# Patient Record
Sex: MALE | Race: BLACK OR AFRICAN AMERICAN | Employment: OTHER | URBAN - METROPOLITAN AREA
[De-identification: names, ages, dates, MRNs, and addresses within clinical notes are randomized per-mention and may not be internally consistent; named-entity substitution may affect disease eponyms.]

---

## 2023-12-29 ENCOUNTER — APPOINTMENT (OUTPATIENT)
Dept: CT IMAGING | Facility: HOSPITAL | Age: 63
End: 2023-12-29
Attending: EMERGENCY MEDICINE
Payer: COMMERCIAL

## 2023-12-29 ENCOUNTER — HOSPITAL ENCOUNTER (INPATIENT)
Facility: HOSPITAL | Age: 63
LOS: 1 days | Discharge: HOME | End: 2023-12-30
Attending: EMERGENCY MEDICINE | Admitting: HOSPITALIST
Payer: COMMERCIAL

## 2023-12-29 ENCOUNTER — APPOINTMENT (OUTPATIENT)
Dept: MRI IMAGING | Facility: HOSPITAL | Age: 63
End: 2023-12-29
Attending: EMERGENCY MEDICINE
Payer: COMMERCIAL

## 2023-12-29 DIAGNOSIS — H53.2 DIPLOPIA: Primary | ICD-10-CM

## 2023-12-29 DIAGNOSIS — R26.81 UNSTEADY GAIT WHEN WALKING: ICD-10-CM

## 2023-12-29 DIAGNOSIS — H49.01 RIGHT OCULOMOTOR NERVE PALSY: ICD-10-CM

## 2023-12-29 PROBLEM — I63.9 CEREBROVASCULAR ACCIDENT (CVA) (HCC): Status: ACTIVE | Noted: 2023-12-29

## 2023-12-29 LAB
ANION GAP SERPL CALC-SCNC: 5 MMOL/L (ref 0–18)
BASOPHILS # BLD AUTO: 0.05 X10(3) UL (ref 0–0.2)
BASOPHILS NFR BLD AUTO: 0.6 %
BUN BLD-MCNC: 17 MG/DL (ref 9–23)
BUN/CREAT SERPL: 18.7 (ref 10–20)
CALCIUM BLD-MCNC: 9.4 MG/DL (ref 8.7–10.4)
CHLORIDE SERPL-SCNC: 108 MMOL/L (ref 98–112)
CHOLEST SERPL-MCNC: 187 MG/DL (ref ?–200)
CO2 SERPL-SCNC: 27 MMOL/L (ref 21–32)
CREAT BLD-MCNC: 0.91 MG/DL
DEPRECATED RDW RBC AUTO: 41.4 FL (ref 35.1–46.3)
EGFRCR SERPLBLD CKD-EPI 2021: 96 ML/MIN/1.73M2 (ref 60–?)
EOSINOPHIL # BLD AUTO: 0.33 X10(3) UL (ref 0–0.7)
EOSINOPHIL NFR BLD AUTO: 4.2 %
ERYTHROCYTE [DISTWIDTH] IN BLOOD BY AUTOMATED COUNT: 14.1 % (ref 11–15)
EST. AVERAGE GLUCOSE BLD GHB EST-MCNC: 143 MG/DL (ref 68–126)
GLUCOSE BLD-MCNC: 133 MG/DL (ref 70–99)
GLUCOSE BLDC GLUCOMTR-MCNC: 129 MG/DL (ref 70–99)
GLUCOSE BLDC GLUCOMTR-MCNC: 147 MG/DL (ref 70–99)
GLUCOSE BLDC GLUCOMTR-MCNC: 80 MG/DL (ref 70–99)
HBA1C MFR BLD: 6.6 % (ref ?–5.7)
HCT VFR BLD AUTO: 46.8 %
HDLC SERPL-MCNC: 38 MG/DL (ref 40–59)
HGB BLD-MCNC: 14.6 G/DL
IMM GRANULOCYTES # BLD AUTO: 0.07 X10(3) UL (ref 0–1)
IMM GRANULOCYTES NFR BLD: 0.9 %
LDLC SERPL CALC-MCNC: 132 MG/DL (ref ?–100)
LYMPHOCYTES # BLD AUTO: 1.68 X10(3) UL (ref 1–4)
LYMPHOCYTES NFR BLD AUTO: 21.2 %
MAGNESIUM SERPL-MCNC: 1.9 MG/DL (ref 1.6–2.6)
MCH RBC QN AUTO: 25.5 PG (ref 26–34)
MCHC RBC AUTO-ENTMCNC: 31.2 G/DL (ref 31–37)
MCV RBC AUTO: 81.7 FL
MONOCYTES # BLD AUTO: 0.51 X10(3) UL (ref 0.1–1)
MONOCYTES NFR BLD AUTO: 6.4 %
NEUTROPHILS # BLD AUTO: 5.3 X10 (3) UL (ref 1.5–7.7)
NEUTROPHILS # BLD AUTO: 5.3 X10(3) UL (ref 1.5–7.7)
NEUTROPHILS NFR BLD AUTO: 66.7 %
NONHDLC SERPL-MCNC: 149 MG/DL (ref ?–130)
OSMOLALITY SERPL CALC.SUM OF ELEC: 293 MOSM/KG (ref 275–295)
PLATELET # BLD AUTO: 331 10(3)UL (ref 150–450)
POTASSIUM SERPL-SCNC: 4.1 MMOL/L (ref 3.5–5.1)
RBC # BLD AUTO: 5.73 X10(6)UL
SODIUM SERPL-SCNC: 140 MMOL/L (ref 136–145)
TRIGL SERPL-MCNC: 92 MG/DL (ref 30–149)
VLDLC SERPL CALC-MCNC: 17 MG/DL (ref 0–30)
WBC # BLD AUTO: 7.9 X10(3) UL (ref 4–11)

## 2023-12-29 PROCEDURE — 70551 MRI BRAIN STEM W/O DYE: CPT | Performed by: EMERGENCY MEDICINE

## 2023-12-29 PROCEDURE — 70450 CT HEAD/BRAIN W/O DYE: CPT | Performed by: EMERGENCY MEDICINE

## 2023-12-29 PROCEDURE — 70498 CT ANGIOGRAPHY NECK: CPT | Performed by: EMERGENCY MEDICINE

## 2023-12-29 PROCEDURE — 70496 CT ANGIOGRAPHY HEAD: CPT | Performed by: EMERGENCY MEDICINE

## 2023-12-29 PROCEDURE — 99223 1ST HOSP IP/OBS HIGH 75: CPT | Performed by: OTHER

## 2023-12-29 RX ORDER — METFORMIN HYDROCHLORIDE EXTENDED-RELEASE TABLETS 500 MG/1
500 TABLET, FILM COATED, EXTENDED RELEASE ORAL
COMMUNITY

## 2023-12-29 RX ORDER — HEPARIN SODIUM 5000 [USP'U]/ML
5000 INJECTION, SOLUTION INTRAVENOUS; SUBCUTANEOUS EVERY 12 HOURS SCHEDULED
Status: DISCONTINUED | OUTPATIENT
Start: 2023-12-29 | End: 2023-12-30

## 2023-12-29 RX ORDER — NICOTINE POLACRILEX 4 MG
15 LOZENGE BUCCAL
Status: DISCONTINUED | OUTPATIENT
Start: 2023-12-29 | End: 2023-12-30

## 2023-12-29 RX ORDER — POLYETHYLENE GLYCOL 3350 17 G/17G
17 POWDER, FOR SOLUTION ORAL DAILY PRN
Status: DISCONTINUED | OUTPATIENT
Start: 2023-12-29 | End: 2023-12-30

## 2023-12-29 RX ORDER — AMLODIPINE BESYLATE 5 MG/1
5 TABLET ORAL DAILY
Status: DISCONTINUED | OUTPATIENT
Start: 2023-12-30 | End: 2023-12-30

## 2023-12-29 RX ORDER — LABETALOL HYDROCHLORIDE 5 MG/ML
10 INJECTION, SOLUTION INTRAVENOUS EVERY 10 MIN PRN
Status: DISCONTINUED | OUTPATIENT
Start: 2023-12-29 | End: 2023-12-30

## 2023-12-29 RX ORDER — ACETAMINOPHEN 325 MG/1
650 TABLET ORAL EVERY 4 HOURS PRN
Status: DISCONTINUED | OUTPATIENT
Start: 2023-12-29 | End: 2023-12-30

## 2023-12-29 RX ORDER — ATORVASTATIN CALCIUM 10 MG/1
10 TABLET, FILM COATED ORAL NIGHTLY
COMMUNITY

## 2023-12-29 RX ORDER — ACETAMINOPHEN 650 MG/1
650 SUPPOSITORY RECTAL EVERY 4 HOURS PRN
Status: DISCONTINUED | OUTPATIENT
Start: 2023-12-29 | End: 2023-12-30

## 2023-12-29 RX ORDER — ACETAMINOPHEN 500 MG
500 TABLET ORAL EVERY 4 HOURS PRN
Status: DISCONTINUED | OUTPATIENT
Start: 2023-12-29 | End: 2023-12-29

## 2023-12-29 RX ORDER — DEXTROSE MONOHYDRATE 25 G/50ML
50 INJECTION, SOLUTION INTRAVENOUS
Status: DISCONTINUED | OUTPATIENT
Start: 2023-12-29 | End: 2023-12-30

## 2023-12-29 RX ORDER — AMLODIPINE BESYLATE 5 MG/1
10 TABLET ORAL ONCE
Status: COMPLETED | OUTPATIENT
Start: 2023-12-29 | End: 2023-12-29

## 2023-12-29 RX ORDER — CLOPIDOGREL BISULFATE 75 MG/1
75 TABLET ORAL DAILY
Status: DISCONTINUED | OUTPATIENT
Start: 2023-12-30 | End: 2023-12-30

## 2023-12-29 RX ORDER — CLOPIDOGREL BISULFATE 75 MG/1
75 TABLET ORAL ONCE
Status: COMPLETED | OUTPATIENT
Start: 2023-12-29 | End: 2023-12-29

## 2023-12-29 RX ORDER — ONDANSETRON 2 MG/ML
4 INJECTION INTRAMUSCULAR; INTRAVENOUS EVERY 6 HOURS PRN
Status: DISCONTINUED | OUTPATIENT
Start: 2023-12-29 | End: 2023-12-29

## 2023-12-29 RX ORDER — PROCHLORPERAZINE EDISYLATE 5 MG/ML
5 INJECTION INTRAMUSCULAR; INTRAVENOUS EVERY 8 HOURS PRN
Status: DISCONTINUED | OUTPATIENT
Start: 2023-12-29 | End: 2023-12-29

## 2023-12-29 RX ORDER — SENNOSIDES 8.6 MG
17.2 TABLET ORAL NIGHTLY PRN
Status: DISCONTINUED | OUTPATIENT
Start: 2023-12-29 | End: 2023-12-30

## 2023-12-29 RX ORDER — NICOTINE POLACRILEX 4 MG
30 LOZENGE BUCCAL
Status: DISCONTINUED | OUTPATIENT
Start: 2023-12-29 | End: 2023-12-30

## 2023-12-29 RX ORDER — ASPIRIN 81 MG/1
81 TABLET ORAL DAILY
COMMUNITY

## 2023-12-29 RX ORDER — HYDRALAZINE HYDROCHLORIDE 20 MG/ML
10 INJECTION INTRAMUSCULAR; INTRAVENOUS EVERY 2 HOUR PRN
Status: DISCONTINUED | OUTPATIENT
Start: 2023-12-29 | End: 2023-12-30

## 2023-12-29 RX ORDER — ATORVASTATIN CALCIUM 40 MG/1
40 TABLET, FILM COATED ORAL NIGHTLY
Status: DISCONTINUED | OUTPATIENT
Start: 2023-12-29 | End: 2023-12-30

## 2023-12-29 RX ORDER — CLOPIDOGREL BISULFATE 75 MG/1
75 TABLET ORAL DAILY
COMMUNITY

## 2023-12-29 RX ORDER — ONDANSETRON 2 MG/ML
4 INJECTION INTRAMUSCULAR; INTRAVENOUS EVERY 6 HOURS PRN
Status: DISCONTINUED | OUTPATIENT
Start: 2023-12-29 | End: 2023-12-30

## 2023-12-29 RX ORDER — BISACODYL 10 MG
10 SUPPOSITORY, RECTAL RECTAL
Status: DISCONTINUED | OUTPATIENT
Start: 2023-12-29 | End: 2023-12-30

## 2023-12-29 RX ORDER — ASPIRIN 81 MG/1
81 TABLET, CHEWABLE ORAL DAILY
Status: DISCONTINUED | OUTPATIENT
Start: 2023-12-29 | End: 2023-12-30

## 2023-12-29 RX ORDER — PROCHLORPERAZINE EDISYLATE 5 MG/ML
5 INJECTION INTRAMUSCULAR; INTRAVENOUS EVERY 8 HOURS PRN
Status: DISCONTINUED | OUTPATIENT
Start: 2023-12-29 | End: 2023-12-30

## 2023-12-29 RX ORDER — SODIUM CHLORIDE 9 MG/ML
INJECTION, SOLUTION INTRAVENOUS CONTINUOUS
Status: DISCONTINUED | OUTPATIENT
Start: 2023-12-29 | End: 2023-12-30

## 2023-12-29 NOTE — ED QUICK NOTES
Assumed care of Pt from triage for c/o double / blurred vision to R eye since 3pm yesterday. States he felt off balance this morning, prompting his visit to the ED.  Stroke alert called at 10:03 am.

## 2023-12-29 NOTE — ED QUICK NOTES
No acute changes from my initial exam. Continues to report blurred/double vision to R eye. Awaiting results of MRI.

## 2023-12-29 NOTE — PLAN OF CARE
Ryan Whiting is A&Ox 4 . Lives at home alone. Patient is ambulating by himself . Patient is not experiencing pain. Continent of bowel and bladder. IV fluids running. Call light in reach, fall precautions maintained. Plan is for echo and neuro Q2 with daily NIH . Problem: Patient Centered Care  Goal: Patient preferences are identified and integrated in the patient's plan of care  Description: Interventions:  - What would you like us to know as we care for you? I live at home alone  - Provide timely, complete, and accurate information to patient/family  - Incorporate patient and family knowledge, values, beliefs, and cultural backgrounds into the planning and delivery of care  - Encourage patient/family to participate in care and decision-making at the level they choose  - Honor patient and family perspectives and choices  Outcome: Progressing    Problem: Diabetes/Glucose Control  Goal: Glucose maintained within prescribed range  Description: INTERVENTIONS:  - Monitor Blood Glucose as ordered  - Assess for signs and symptoms of hyperglycemia and hypoglycemia  - Administer ordered medications to maintain glucose within target range  - Assess barriers to adequate nutritional intake and initiate nutrition consult as needed  - Instruct patient on self management of diabetes  Outcome: Progressing     Problem: SAFETY ADULT - FALL  Goal: Free from fall injury  Description: INTERVENTIONS:  - Assess pt frequently for physical needs  - Identify cognitive and physical deficits and behaviors that affect risk of falls.   - [place] fall precautions as indicated by assessment.  - Educate pt/family on patient safety including physical limitations  - Instruct pt to call for assistance with activity based on assessment  - Modify environment to reduce risk of injury  - Provide assistive devices as appropriate  - Consider OT/PT consult to assist with strengthening/mobility  - Encourage toileting schedule  Outcome: Progressing Problem: NEUROLOGICAL - ADULT  Goal: Achieves stable or improved neurological status  Description: INTERVENTIONS  - Assess for and report changes in neurological status  - Initiate measures to prevent increased intracranial pressure  - Maintain blood pressure and fluid volume within ordered parameters to optimize cerebral perfusion and minimize risk of hemorrhage  - Monitor temperature, glucose, and sodium.  Initiate appropriate interventions as ordered  Outcome: Progressing  Goal: Absence of seizures  Description: INTERVENTIONS  - Monitor for seizure activity  - Administer anti-seizure medications as ordered  - Monitor neurological status  Outcome: Progressing  Goal: Remains free of injury related to seizure activity  Description: INTERVENTIONS:  - Maintain airway, patient safety  and administer oxygen as ordered  - Monitor patient for seizure activity, document and report duration and description of seizure to MD/LIP  - If seizure occurs, turn patient to side and suction secretions as needed  - Reorient patient post seizure  - Seizure pads on all 4 side rails  - Instruct patient/family to notify RN of any seizure activity  - Instruct patient/family to call for assistance with activity based on assessment  Outcome: Progressing  Goal: Achieves maximal functionality and self care  Description: INTERVENTIONS  - Monitor swallowing and airway patency with patient fatigue and changes in neurological status  - Encourage and assist patient to increase activity and self care with guidance from PT/OT  - Encourage visually impaired, hearing impaired and aphasic patients to use assistive/communication devices  Outcome: Progressing

## 2023-12-29 NOTE — ED QUICK NOTES
Orders for admission, patient is aware of plan and ready to go upstairs. Any questions, please call ED RN Marylen Ivans at extension [phone].      Patient Covid vaccination status: Fully vaccinated     COVID Test Ordered in ED: None    COVID Suspicion at Admission: N/A    Running Infusions:  None    Mental Status/LOC at time of transport: alert and oriented 4/4    Other pertinent information:   CIWA score: N/A   NIH score:  1

## 2023-12-29 NOTE — ED QUICK NOTES
Home meds reviewed with Dr Carina Rowell (Pt did not take his Plavix or Amlodipine today). Admin as ordered.

## 2023-12-29 NOTE — ED INITIAL ASSESSMENT (HPI)
Patient to ED for double vision in right eye since yesterday beginning at approx 1500. States he has been feeling \"off balance\" shortly after blurred vision started. Hx stroke 1.5 months ago and 2 years ago. On Plavix and baby ASA.  A/Ox4

## 2023-12-30 ENCOUNTER — APPOINTMENT (OUTPATIENT)
Dept: CV DIAGNOSTICS | Facility: HOSPITAL | Age: 63
End: 2023-12-30
Attending: Other
Payer: COMMERCIAL

## 2023-12-30 VITALS
OXYGEN SATURATION: 97 % | TEMPERATURE: 98 F | WEIGHT: 245.81 LBS | HEIGHT: 74 IN | RESPIRATION RATE: 18 BRPM | BODY MASS INDEX: 31.55 KG/M2 | HEART RATE: 78 BPM | SYSTOLIC BLOOD PRESSURE: 156 MMHG | DIASTOLIC BLOOD PRESSURE: 100 MMHG

## 2023-12-30 PROBLEM — H51.21 INO (INTERNUCLEAR OPHTHALMOPLEGIA), RIGHT: Status: ACTIVE | Noted: 2023-12-30

## 2023-12-30 PROBLEM — R26.81 GAIT INSTABILITY: Status: ACTIVE | Noted: 2023-12-29

## 2023-12-30 PROBLEM — H49.01 RIGHT OCULOMOTOR NERVE PALSY: Status: RESOLVED | Noted: 2023-12-29 | Resolved: 2023-12-30

## 2023-12-30 PROBLEM — H53.2 DIPLOPIA: Status: RESOLVED | Noted: 2023-12-29 | Resolved: 2023-12-30

## 2023-12-30 PROBLEM — R26.81 UNSTEADY GAIT WHEN WALKING: Status: RESOLVED | Noted: 2023-12-29 | Resolved: 2023-12-30

## 2023-12-30 PROBLEM — I67.82 SUBCORTICAL MICROVASCULAR ISCHEMIC OCCLUSIVE DISEASE: Status: ACTIVE | Noted: 2023-12-30

## 2023-12-30 PROBLEM — I63.9 CEREBROVASCULAR ACCIDENT (CVA) (HCC): Status: RESOLVED | Noted: 2023-12-29 | Resolved: 2023-12-30

## 2023-12-30 LAB
GLUCOSE BLDC GLUCOMTR-MCNC: 113 MG/DL (ref 70–99)
GLUCOSE BLDC GLUCOMTR-MCNC: 116 MG/DL (ref 70–99)

## 2023-12-30 PROCEDURE — 93306 TTE W/DOPPLER COMPLETE: CPT | Performed by: OTHER

## 2023-12-30 PROCEDURE — 99232 SBSQ HOSP IP/OBS MODERATE 35: CPT | Performed by: OTHER

## 2023-12-30 NOTE — SLP NOTE
ADULT SWALLOWING EVALUATION    ASSESSMENT    ASSESSMENT/OVERALL IMPRESSION:    Orders received, chart reviewed, clinical bedside swallow evaluation complete per stroke protocol. Patient admitted from home with c/o diplopia and blurry vision, PMH includes CVA 11/2023 without residual deficits, CTH and MRI brain 12/29/23 non acute as below. No hx SLP service per this EMR review. Patient denies acute/remote dysphagia, denies acute/remote changes in cognition and/or communication. Patient passed RN swallow screen 12/29/23. No family at bedside. Patient seated upright in chair at bedside, afebrile, in NAD, oriented x4. Oral motor mechanism examination unremarkable. Speech intelligibility 100% in conversation. Expressive and receptive language appear intact to support conversation without overt word finding or comprehension deficits. Patient self-administered bites of dry solid crackers and cup/straw sips thin liquids with oropharyngeal timing and control which appears adequate/functional and without overt signs aspiration/distress. At this time, patient presents without overt evidence oropharyngeal dysphagia at bedside and appears appropriate to continue current diet with standard aspiration precautions. As well, patient presents without acute concern for changes to cognitive-communication per informal assessment and negative imaging. No further SLP service appears indicated at this time, will sign off. Plan and recommendations reviewed with patient and RN at bedside. FCM score:7/7.      RECOMMENDATIONS   Diet Recommendations - Solids: Regular  Diet Recommendations - Liquids:  Thin Liquids  Medication Administration Recommendations: No restrictions  Treatment Plan/Recommendations: No further inpatient SLP service warranted  Discharge Recommendations/Plan: [address] [facility]  Reason for Referral: Stroke protocol    Problem List  Principal Problem:    Diplopia  Active Problems: Cerebrovascular accident (CVA) ([facility] [address]    Unsteady gait when walking    Right oculomotor nerve palsy    Past Medical History  Past Medical History:   Diagnosis Date    Essential hypertension     Hyperlipidemia     Stroke [facility])     2019, Nov 2023     Prior Living Situation: Home alone  Diet Prior to Admission: Regular; Thin liquids  Precautions: Aspiration    Patient/Family Goals: Return home    SWALLOWING HISTORY  Current Diet Consistency: Regular; Thin liquids  Dysphagia History: None as above    Imaging Results:     MRI brain 12/29/23:  CONCLUSION:   1. No acute infarct or hemorrhage. 2. Nonspecific pathologic foci in cerebral white matter likely reflect moderate changes of chronic small vessel disease. 3. Chronic right internal capsule and left thalamic lacunar infarcts related to small vessel disease. [facility] 12/29/23:  CONCLUSION:   1. Nonspecific increased density of the right middle cerebral artery which could represent atherosclerosis or thrombus. Recommend follow-up CTA. 2.  Scattered hypodensities within the cerebral white matter likely representing changes of microvascular white matter ischemic change related to long-standing hypertension and/or diabetes. OBJECTIVE     Dentition: Functional  Symmetry: Within Functional Limits  Strength: Within Functional Limits  Tone: Within Functional Limits  Range of Motion: Within Functional Limits  Rate of Motion: Within Functional Limits    Voice Quality: Clear  Respiratory Status: Unlabored  Consistencies Trialed: Thin liquids; Hard solid  Method of Presentation: Self presentation  Patient Positioning: Upright;Midline    Oral Phase of Swallow: Within Functional Limits    Pharyngeal Phase of Swallow: Within Functional Limits  (Please note: Silent aspiration cannot be evaluated clinically.  Videofluoroscopic Swallow Study is required to rule-out silent aspiration.)    Esophageal Phase of Swallow: No complaints consistent with possible esophageal involvement      FOLLOW UP  Treatment Plan/Recommendations: No further inpatient SLP service warranted     Follow Up Needed (Documentation Required): No       Thank you for your referral.   If you have any questions, please contact RAMOS Kaur M.S. [place] Pathologist  [phone]

## 2023-12-30 NOTE — CM/SW NOTE
[facility]. Per chart, PT/OT recommend Home. Notes still pending at this time. Per chart, pt is from home alone in [place] and is currently in [facility] visiting [facility]. Pt is independent and requiring no needs at this time. PLAN: Home, no needs - pending med clear      SW/CM to remain available for support and/or discharge planning.          Stefany Parra, MSW, [facility]

## 2023-12-30 NOTE — PLAN OF CARE
Pt A/Ox4. Room air. No complaints. Q4 neuros and nih daily. Eye patch removed in am for assessment- no blurred vision or diplopia present. Echo performed. Cleared by neuro. DC order in place. AVS printed and education provided. Home with family. Safety precautions in place. Problem: Patient Centered Care  Goal: Patient preferences are identified and integrated in the patient's plan of care  Description: Interventions:  - What would you like us to know as we care for you?   - Provide timely, complete, and accurate information to patient/family  - Incorporate patient and family knowledge, values, beliefs, and cultural backgrounds into the planning and delivery of care  - Encourage patient/family to participate in care and decision-making at the level they choose  - Honor patient and family perspectives and choices  Outcome: Completed     Problem: Diabetes/Glucose Control  Goal: Glucose maintained within prescribed range  Description: INTERVENTIONS:  - Monitor Blood Glucose as ordered  - Assess for signs and symptoms of hyperglycemia and hypoglycemia  - Administer ordered medications to maintain glucose within target range  - Assess barriers to adequate nutritional intake and initiate nutrition consult as needed  - Instruct patient on self management of diabetes  Outcome: Completed     Problem: Patient/Family Goals  Goal: Patient/Family Long Term Goal  Description: Patient's Long Term Goal:     Interventions:  -   - See additional Care Plan goals for specific interventions  Outcome: Completed  Goal: Patient/Family Short Term Goal  Description: Patient's Short Term Goal:     Interventions:   -   - See additional Care Plan goals for specific interventions  Outcome: Completed     Problem: SAFETY ADULT - FALL  Goal: Free from fall injury  Description: INTERVENTIONS:  - Assess pt frequently for physical needs  - Identify cognitive and physical deficits and behaviors that affect risk of falls.   - [place] fall precautions as indicated by assessment.  - Educate pt/family on patient safety including physical limitations  - Instruct pt to call for assistance with activity based on assessment  - Modify environment to reduce risk of injury  - Provide assistive devices as appropriate  - Consider OT/PT consult to assist with strengthening/mobility  - Encourage toileting schedule  Outcome: Completed     Problem: NEUROLOGICAL - ADULT  Goal: Achieves stable or improved neurological status  Description: INTERVENTIONS  - Assess for and report changes in neurological status  - Initiate measures to prevent increased intracranial pressure  - Maintain blood pressure and fluid volume within ordered parameters to optimize cerebral perfusion and minimize risk of hemorrhage  - Monitor temperature, glucose, and sodium.  Initiate appropriate interventions as ordered  Outcome: Completed  Goal: Absence of seizures  Description: INTERVENTIONS  - Monitor for seizure activity  - Administer anti-seizure medications as ordered  - Monitor neurological status  Outcome: Completed  Goal: Remains free of injury related to seizure activity  Description: INTERVENTIONS:  - Maintain airway, patient safety  and administer oxygen as ordered  - Monitor patient for seizure activity, document and report duration and description of seizure to MD/LIP  - If seizure occurs, turn patient to side and suction secretions as needed  - Reorient patient post seizure  - Seizure pads on all 4 side rails  - Instruct patient/family to notify RN of any seizure activity  - Instruct patient/family to call for assistance with activity based on assessment  Outcome: Completed  Goal: Achieves maximal functionality and self care  Description: INTERVENTIONS  - Monitor swallowing and airway patency with patient fatigue and changes in neurological status  - Encourage and assist patient to increase activity and self care with guidance from PT/OT  - Encourage visually impaired, hearing impaired and aphasic patients to use assistive/communication devices  Outcome: Completed

## 2023-12-30 NOTE — DISCHARGE SUMMARY
General Medicine Discharge Summary     Patient ID:  Amy De La Paz  66year old  1/12/1960    Admit date: 12/29/2023    Discharge date and time: No discharge date for patient encounter. 12/30/23    Attending Physician: Brea Ambrocio MD     Consults: IP CONSULT TO SOCIAL WORK    Primary Care Physician: No primary care provider on file. Reason for admission: TIA    Risk For Readmission: low    Discharge Diagnoses: Diplopia [H53.2]  Right oculomotor nerve palsy [H49.01]  Unsteady gait when walking [R26.81]  See Additional Discharge Diagnoses in Hospital Course    Discharged Condition: stable    Follow-up with labs/images appointments: PCP    Exam  Gen: No acute distress  Pulm: Lungs clear, normal respiratory effort  CV: Heart with regular rate and rhythm  Abd: Abdomen soft,     HPI: per chart  Amy De La Paz is a 66year old male with PMH sig for CVA in November 2023 with left-sided numbness and tingling that has since resolved, hypertension, hyperlipidemia who presented from his sister's house (visiting from [place]) with sudden onset blurry vision and unstable gait which she states is due to the vision issues that started around 3 PM on 11/28/2023. Imaging for stroke bleed or mass was negative in the ER. Patient is on aspirin and Plavix at baseline, continue. Patient's blood pressure mildly elevated, given dose of Norvasc 10 mg in the ER,      States symptoms started suddenly around 3 PM yesterday. Denies any chest pain or shortness of breath no nausea vomiting diarrhea no headaches no cough. He denies any weakness numbness or tingling. He states that his walking feels fine except for when he is having a blurry vision it makes him unsteady. Denies any falls at home. He is out here visiting from [place]. His return date was the 31st with a flight at 4 PM.  He is aware he may not be able to make that flight.     Hospital Course:   Amy De La Paz is a 66year old male with PMH sig for CVA in November 2023 with left-sided numbness and tingling that has since resolved, hypertension, hyperlipidemia who presented from his sister's house (visiting from [place]) with sudden onset blurry vision and unstable gait which she states is due to the vision issues that started around 3 PM on 11/28/2023. Imaging for stroke bleed or mass was negative in the ER. Patient is on aspirin and Plavix at baseline, continue. Patient's blood pressure mildly elevated, given dose of Norvasc 10 mg in the ER. Head CT, CTA, MRI negative for acute finding. Likely TIA. Seen by neurology with plans to continue aspirin and Plavix with statin. A1c 6.6. Will likely need hypertensive medications on follow-up     Blurry vision, right 3rd nerve palsy  - Head CT, CTA, MRI negative for acute finding  - Does have a history of CVA in November 2023 with complete resolution of symptoms  - Continue aspirin and Plavix  - Neurology eval for possible Isai Power syndrome, if clinical suspicion would start IVIG  - PT OT social work     Hypertension  - Patient not on home meds, given 10 of Norvasc in the ER     Hyperlipidemia  - Continue statin     Prediabetes  - Reported by patient, check A1c, diabetic diet and follow sugars and sliding scale, titrate as needed    Operative Procedures:      Imaging: MRI BRAIN WO ACUTE (3) SEQUENCE (CPT=70551)    Result Date: 12/29/2023  CONCLUSION:  1. No acute infarct or hemorrhage. 2. Nonspecific pathologic foci in cerebral white matter likely reflect moderate changes of chronic small vessel disease. 3. Chronic right internal capsule and left thalamic lacunar infarcts related to small vessel disease. Dictated by (CST): Emma Pugh MD on 12/29/2023 at 1:52 PM     Finalized by (CST): Emma Pugh MD on 12/29/2023 at 1:57 PM          CT STROKE CTA BRAIN/CTA NECK (W IV)(CPT=70496/38845)    Result Date: 12/29/2023  CONCLUSION: No large artery occlusion.   No thrombus in the right MCA      Dictated by (CST): Maddie Colon MD on 12/29/2023 at 10:44 AM     Finalized by (CST): Maddie Colon MD on 12/29/2023 at 10:50 AM          CT STROKE BRAIN (NO IV)(CPT=70450)    Result Date: 12/29/2023  CONCLUSION:  1. Nonspecific increased density of the right middle cerebral artery which could represent atherosclerosis or thrombus. Recommend follow-up CTA. 2.  Scattered hypodensities within the cerebral white matter likely representing changes of microvascular white matter ischemic change related to long-standing hypertension and/or diabetes. Exam discussed with Dr. Josephine Cortes on 12/29/23 at 10:35 a.m. Dictated by (CST): Tammie Isaacs MD on 12/29/2023 at 10:32 AM     Finalized by (CST): Tammie Isaacs MD on 12/29/2023 at 10:36 AM             Disposition: home    Activity: as tolerated   Diet: general   Wound Care: no needs  Code Status: Full Code  O2: no needs    Home Medication Changes: as below   All discharge medications have been reconciled with current medication list.     Med list     Medication List        CONTINUE taking these medications      aspirin 81 MG Tbec     atorvastatin 10 MG Tabs  Commonly known as: Lipitor     clopidogrel 75 MG Tabs  Commonly known as: Plavix     metFORMIN HCl ER (OSM) 500 MG (OSM) Tb24  Commonly known as: FORTAMET              FU PCP in 1 week       DC instructions: Other Discharge Instructions: Follow up with PCP at home in 1 week. Continue current medications. Patient had opportunity to ask questions and state understand and agree with therapeutic plan as outlined    Thank You,    Ruth Ann Moreau. Milagros Garcia M.D.   [facility]

## 2023-12-30 NOTE — PLAN OF CARE
Bed locked in low position, belongings in reach, call light in reach. Frequent rounding done, all patient needs met. Non-slip socks on/at bedside. No neuro changes thus fat this shift, RT eye patch remains in place and with it there is no diplopia present. Up ad frederick, patient instructed to call if dizzy or any other S/S appear. Neuro checks are Q4 hr now. IVF continues to run. Problem: Patient Centered Care  Goal: Patient preferences are identified and integrated in the patient's plan of care  Description: Interventions:  - What would you like us to know as we care for you? I'm just here visiting family, I live in [place]. I've had 2 other \"strokes\"/TIA in the past.  - Provide timely, complete, and accurate information to patient/family  - Incorporate patient and family knowledge, values, beliefs, and cultural backgrounds into the planning and delivery of care  - Encourage patient/family to participate in care and decision-making at the level they choose  - Honor patient and family perspectives and choices  Outcome: Progressing     Problem: Diabetes/Glucose Control  Goal: Glucose maintained within prescribed range  Description: INTERVENTIONS:  - Monitor Blood Glucose as ordered  - Assess for signs and symptoms of hyperglycemia and hypoglycemia  - Administer ordered medications to maintain glucose within target range  - Assess barriers to adequate nutritional intake and initiate nutrition consult as needed  - Instruct patient on self management of diabetes  Outcome: Progressing      Problem: SAFETY ADULT - FALL  Goal: Free from fall injury  Description: INTERVENTIONS:  - Assess pt frequently for physical needs  - Identify cognitive and physical deficits and behaviors that affect risk of falls.   - [place] fall precautions as indicated by assessment.  - Educate pt/family on patient safety including physical limitations  - Instruct pt to call for assistance with activity based on assessment  - Modify environment to reduce risk of injury  - Provide assistive devices as appropriate  - Consider OT/PT consult to assist with strengthening/mobility  - Encourage toileting schedule  Outcome: Progressing     Problem: NEUROLOGICAL - ADULT  Goal: Achieves stable or improved neurological status  Description: INTERVENTIONS  - Assess for and report changes in neurological status  - Initiate measures to prevent increased intracranial pressure  - Maintain blood pressure and fluid volume within ordered parameters to optimize cerebral perfusion and minimize risk of hemorrhage  - Monitor temperature, glucose, and sodium.  Initiate appropriate interventions as ordered  Outcome: Progressing  Goal: Achieves maximal functionality and self care  Description: INTERVENTIONS  - Monitor swallowing and airway patency with patient fatigue and changes in neurological status  - Encourage and assist patient to increase activity and self care with guidance from PT/OT  - Encourage visually impaired, hearing impaired and aphasic patients to use assistive/communication devices  Outcome: Progressing

## 2023-12-30 NOTE — PHYSICAL THERAPY NOTE
PHYSICAL THERAPY EVALUATION - INPATIENT     Room Number: 346/346-A  Evaluation Date: 12/30/2023  Type of Evaluation: Initial   Physician Order: PT Eval and Treat    Presenting Problem: diplopia  Co-Morbidities : CVA  Reason for Therapy: Mobility Dysfunction and Discharge Planning    PHYSICAL THERAPY ASSESSMENT     Patient is a 66year old male admitted 12/29/2023 for diplopia. Patient' currently presents with no acute functional deficits and demonstrated all functional mobility at supervision to mod I level and safe to return home with no services. RN approved participation with physical therapy. Pt was received sitting in chair and agreeable to activity. Educated on role of PT and PT plan of care, goals for this session. Pt verbalized understanding. Pt reported diplopia in R eye has resolved and has no other impairments at this time. Prior to admission, pt living in [place] and visiting family in [facility], independent, lives alone in apartment with elevator, no assistive device, hx of CVA with resolved R sided deficits. Transfers: independent for STS without assistive device   Gait: ambulated 125 ft without assistive device, supervision only. Baseline R foot numbness therefore noted to have decreased stance on RLE, but otherwise no significant gait deviations or LOB. Pt reported feeling at baseline with gait and continued to deny diplopia. Per discussion with pt, no further questions or concerns about safe mobility and DC home. Pt was left sitting in chair with needs within reach, handoff to RN complete. The patient's Approx Degree of Impairment: 20.91% has been calculated based on documentation in the [facility] '6 clicks' Inpatient Basic Mobility Short Form. Research supports that patients with this level of impairment may benefit from home with no services.     DISCHARGE RECOMMENDATIONS  PT Discharge Recommendations: Home    PLAN    Patient has been evaluated and presents with no skilled Physical Therapy needs at this time. Patient will be discharged from Physical Therapy services. Please re-order if a new functional limitation presents during this admission. PHYSICAL THERAPY MEDICAL/SOCIAL HISTORY     Problem List  Principal Problem:    Diplopia  Active Problems:    Cerebrovascular accident (CVA) ([address]    Unsteady gait when walking    Right oculomotor nerve palsy      HOME SITUATION  Home Situation  Type of Home: Apartment  Home Layout: One level;Elevator  Stairs to Enter : 0  Lives With: Alone  Drives: Yes  Patient Owned Equipment: None  Patient Regularly Uses: None     Prior Level of [place]: independent with ADLs and mobility without assistive device    SUBJECTIVE  Agreeable to activity. PHYSICAL THERAPY EXAMINATION     OBJECTIVE  Precautions: None  Fall Risk: Standard fall risk    WEIGHT BEARING RESTRICTION  Weight Bearing Restriction: None    PAIN ASSESSMENT  Ratin    COGNITION  Overall Cognitive Status:  WFL - within functional limits    RANGE OF MOTION AND STRENGTH ASSESSMENT  Upper extremity ROM and strength are within functional limits. Lower extremity ROM is within functional limits. Lower extremity strength is within functional limits. BALANCE  Static Sitting: Good  Dynamic Sitting: Fair +  Static Standing: Fair +  Dynamic Standing: Fair    AM-PAC '6-Clicks' INPATIENT SHORT FORM - BASIC MOBILITY  How much difficulty does the patient currently have. .. Patient Difficulty: Turning over in bed (including adjusting bedclothes, sheets and blankets)?: None   Patient Difficulty: Sitting down on and standing up from a chair with arms (e.g., wheelchair, bedside commode, etc.): None   Patient Difficulty: Moving from lying on back to sitting on the side of the bed?: None   How much help from another person does the patient currently need. ..    Help from Another: Moving to and from a bed to a chair (including a wheelchair)?: None   Help from Another: Need to walk in hospital room?: A Little Help from Another: Climbing 3-5 steps with a railing?: A Little     AM-PAC Score:  Raw Score: 22   Approx Degree of Impairment: 20.91%   Standardized Score (AM-PAC Scale): 53.28   CMS Modifier (G-Code): CJ    FUNCTIONAL ABILITY STATUS  Functional Mobility/Gait Assessment  Gait Assistance: Independent  Distance (ft): 125  Assistive Device: None  Pattern: R Decreased stance time    Bed Mobility: NT    Transfers: independent     Exercise/Education Provided: Body mechanics  Energy conservation  Functional activity tolerated  Gait training  Strengthening  Transfer training    Patient End of Session: Needs met;Call light within reach;Up in chair;RN aware of session/findings; All patient questions and concerns addressed    Patient was able to achieve the following . ..    Patient able to transfer  At previous, functional level  Safely and independently    Patient able to ambulate on level surfaces  At previous, functional level  Safely and with supervision        Patient Evaluation Complexity Level:  History Low - no personal factors and/or co-morbidities   Examination of body systems Low - addressing 1-2 elements   Clinical Presentation Low - Stable   Clinical Decision Making Low Complexity       Therapeutic Activity: 10 minutes

## 2024-01-02 ENCOUNTER — PATIENT OUTREACH (OUTPATIENT)
Dept: CASE MANAGEMENT | Age: 64
End: 2024-01-02

## 2024-01-02 NOTE — PROGRESS NOTES
TCM chart review.  No TCM as patient follows with outside [facility] PCP.  Encounter closing.

## 2024-01-03 ENCOUNTER — PATIENT OUTREACH (OUTPATIENT)
Dept: CASE MANAGEMENT | Age: 64
End: 2024-01-03

## 2024-01-03 NOTE — PROGRESS NOTES
Neuro/Stroke apt request (discharged 12/30/23)    Dr Hilario Taylor  [address] [place] [address] [id number] [phone]  [facility] to call [phone] to assist w/scheduling apt

## 2024-01-05 NOTE — PROGRESS NOTES
Neuro/Stroke apt request (discharged 12/30/23)     Dr Hilario Taylor  [address] [place] [id number] [phone]  Multiple attempts w/no calls back; no apt made  Closing encounter